# Patient Record
Sex: MALE | Race: BLACK OR AFRICAN AMERICAN | Employment: FULL TIME | URBAN - METROPOLITAN AREA
[De-identification: names, ages, dates, MRNs, and addresses within clinical notes are randomized per-mention and may not be internally consistent; named-entity substitution may affect disease eponyms.]

---

## 2017-05-11 ENCOUNTER — TELEPHONE (OUTPATIENT)
Dept: FAMILY MEDICINE CLINIC | Facility: CLINIC | Age: 47
End: 2017-05-11

## 2017-05-11 NOTE — TELEPHONE ENCOUNTER
Pt is requesting a referral for a podiatrist  DX Code:bone spur in heal   CPT Code for visit: OV   Number of visits requested:4   If Requesting Out of Network Provider, Please provide reason  Last seen 3/2016.  Apt needed

## 2017-05-17 ENCOUNTER — OFFICE VISIT (OUTPATIENT)
Dept: FAMILY MEDICINE CLINIC | Facility: CLINIC | Age: 47
End: 2017-05-17

## 2017-05-17 VITALS
HEART RATE: 87 BPM | WEIGHT: 242 LBS | BODY MASS INDEX: 29.78 KG/M2 | DIASTOLIC BLOOD PRESSURE: 74 MMHG | HEIGHT: 75.5 IN | OXYGEN SATURATION: 99 % | RESPIRATION RATE: 16 BRPM | TEMPERATURE: 99 F | SYSTOLIC BLOOD PRESSURE: 118 MMHG

## 2017-05-17 DIAGNOSIS — M77.32 HEEL SPUR, LEFT: Primary | ICD-10-CM

## 2017-05-17 DIAGNOSIS — M79.672 INTRACTABLE LEFT HEEL PAIN: ICD-10-CM

## 2017-05-17 PROCEDURE — 99213 OFFICE O/P EST LOW 20 MIN: CPT | Performed by: FAMILY MEDICINE

## 2017-05-17 NOTE — PATIENT INSTRUCTIONS
Understanding Heel Pain  Your heel is the back part of your foot. A band of tissue called the plantar fascia connects the heel bone to the bones in the ball of your foot. Nerves run from the heel up the inside of your ankle and into your leg.  When you fe The plantar fascia is a thick, fibrous layer of tissue that covers the bones on the bottom of your foot. It holds the foot bones in an arched position. Plantar fasciitis is a painful swelling of the plantar fascia.   A heel spur is an overgrowth of bone whe · Avoid activities that stress the feet: jogging, prolonged standing or walking, contact sports, etc.  · First thing in the morning and before sports, stretch the bottom of your foot. Gently flex your ankle so the toes move toward your knee.   · Icing may h

## 2017-05-17 NOTE — PROGRESS NOTES
Imani Barton is a 42year old male. HPI:   Pt states active with exercise and plays basketball. He has a hx of right heel spur in past in 2014 and had surgery which relieved all of his symptoms. At that time he was diagnosed with a left heel spur.  He now 118/74 mmHg  Pulse 87  Temp(Src) 98.7 °F (37.1 °C) (Temporal)  Resp 16  Ht 75.5\"  Wt 242 lb  BMI 29.84 kg/m2  SpO2 99%  GENERAL: well developed, well nourished,in no apparent distress, tall athletic black male  SKIN: no rashes,no suspicious lesions  HEENT

## 2017-05-25 ENCOUNTER — TELEPHONE (OUTPATIENT)
Dept: FAMILY MEDICINE CLINIC | Facility: CLINIC | Age: 47
End: 2017-05-25

## 2017-05-25 DIAGNOSIS — M76.62 TENDONITIS, ACHILLES, LEFT: ICD-10-CM

## 2017-05-25 DIAGNOSIS — M25.772 OSTEOPHYTE OF LEFT ANKLE: Primary | ICD-10-CM

## 2017-05-26 NOTE — TELEPHONE ENCOUNTER
I spoke to fernando from Dr Jeffory Dancer office, she said the insurance company told her the PCP needs to write out the referral with the CPT codes of 0421 34 84 07, DX code M25.772 and M76.62. No pre-cert is needed.  I told her I can put referral in syst

## 2017-05-31 ENCOUNTER — TELEPHONE (OUTPATIENT)
Dept: FAMILY MEDICINE CLINIC | Facility: CLINIC | Age: 47
End: 2017-05-31

## 2017-06-15 ENCOUNTER — APPOINTMENT (OUTPATIENT)
Dept: LAB | Age: 47
End: 2017-06-15
Attending: FAMILY MEDICINE
Payer: COMMERCIAL

## 2017-06-15 ENCOUNTER — OFFICE VISIT (OUTPATIENT)
Dept: FAMILY MEDICINE CLINIC | Facility: CLINIC | Age: 47
End: 2017-06-15

## 2017-06-15 VITALS
SYSTOLIC BLOOD PRESSURE: 122 MMHG | HEIGHT: 75 IN | TEMPERATURE: 98 F | HEART RATE: 77 BPM | RESPIRATION RATE: 12 BRPM | BODY MASS INDEX: 28.6 KG/M2 | WEIGHT: 230 LBS | OXYGEN SATURATION: 99 % | DIASTOLIC BLOOD PRESSURE: 70 MMHG

## 2017-06-15 DIAGNOSIS — E55.9 VITAMIN D DEFICIENCY: ICD-10-CM

## 2017-06-15 DIAGNOSIS — Z01.818 PREOP EXAMINATION: ICD-10-CM

## 2017-06-15 DIAGNOSIS — M77.32 HEEL SPUR, LEFT: ICD-10-CM

## 2017-06-15 DIAGNOSIS — Z01.818 PREOP EXAMINATION: Primary | ICD-10-CM

## 2017-06-15 DIAGNOSIS — R94.31 ABNORMAL ELECTROCARDIOGRAM (ECG) (EKG): ICD-10-CM

## 2017-06-15 PROCEDURE — 82306 VITAMIN D 25 HYDROXY: CPT

## 2017-06-15 PROCEDURE — 99214 OFFICE O/P EST MOD 30 MIN: CPT | Performed by: FAMILY MEDICINE

## 2017-06-15 PROCEDURE — 36415 COLL VENOUS BLD VENIPUNCTURE: CPT

## 2017-06-15 PROCEDURE — 80053 COMPREHEN METABOLIC PANEL: CPT

## 2017-06-15 PROCEDURE — 85027 COMPLETE CBC AUTOMATED: CPT

## 2017-06-15 PROCEDURE — 93000 ELECTROCARDIOGRAM COMPLETE: CPT | Performed by: FAMILY MEDICINE

## 2017-06-15 NOTE — PROGRESS NOTES
HPI:    Patient ID: Tanner Varela is a 42year old male. HPI  Patient is here for preop physical for surgery for heel spur on the left  Heel on 6/23/2017. Patient feels well and has no history of anesthetic problems.   He had surgery on the right heel f diagnosis)  Heel spur, left  Abnormal electrocardiogram (ecg) (ekg)  Vitamin d deficiency  Patient has normal physical exam.  EKG does show ST elevation, probable repolarization variant. Will get opinion from cardiology and clearance prior to surgery.

## 2017-06-19 ENCOUNTER — TELEPHONE (OUTPATIENT)
Dept: FAMILY MEDICINE CLINIC | Facility: CLINIC | Age: 47
End: 2017-06-19

## 2017-06-19 NOTE — TELEPHONE ENCOUNTER
----- Message from Charmayne Devoid, MD sent at 6/16/2017 10:44 AM CDT -----  Labs are good for surgery. I did recheck a Vit D as he has a history of vitamin D deficiency. It is somewhat low at 25. Recommend vitamin D OTC 0119-4528 units daily.   Recheck

## 2017-06-19 NOTE — TELEPHONE ENCOUNTER
Pt was seen on 06/15 for pre op physical and was seen by cardiology on 6/16 and was cleared for surgery. All labs were ok for surgery.   Please addend note from 06/15/17 clearing patient for his upcoming surgery

## 2018-01-18 ENCOUNTER — PATIENT OUTREACH (OUTPATIENT)
Dept: FAMILY MEDICINE CLINIC | Facility: CLINIC | Age: 48
End: 2018-01-18

## (undated) NOTE — MR AVS SNAPSHOT
[facility] [address] [place] [address] [address] [facility]  [id number] [phone]               Thank you for choosing us for your health care visit with Christian Valenzuela DO.   We are glad to serve you and happy to provide you wi physician's office. At that time, you will be provided with any authorization numbers or be assured that none are required. You can then schedule your appointment.  Failure to obtain required authorization numbers can create reimbursement difficulties for y To prevent future heel pain, wear shoes with well-cushioned heels. And do exercises prescribed by your healthcare provider to stretch the plantar fascia and the muscles in the lower leg.    Date Last Reviewed: 9/10/2015  © 3265-6065 [employer] Replace athletic shoes when they become worn out. Don’t walk or run barefoot. · Premade or custom-fitted shoe inserts may be helpful.  Inserts made of silicone seem to be the most effective. Custom-made inserts can be provided by a podiatrist or foot speci Allergies as of May 17, 2017     No Known Allergies                Today's Vital Signs     BP Pulse Temp Height Weight BMI    118/74 mmHg 87 98.7 °F (37.1 °C) (Temporal) 75.5\" 242 lb 29.84 kg/m2         Current Medications          This list is accurate a HOW TO GET STARTED: HOW TO STAY MOTIVATED:   Start activities slowly and build up over time Do what you like   Get your heart pumping – brisk walking, biking, swimming Even 10 minute increments are effective and add up over the week   2 ½ hours per week –

## (undated) NOTE — MR AVS SNAPSHOT
[facility] [address] [place] [address] [address] [facility]  [address]ae Carp[address] [id number] [phone]               Thank you for choosing us for your health care visit with Parish Be MD.  We are glad to serve you and happy to provide you wit CARDIO - EXTERNAL [[id number] CPT(R)]  Order #:  [id number]         **REFERRAL REQUEST**    Your physician has referred you to a specialist.  Your physician or the clinic staff will provide you with the phone number you should call to schedule your appointment office, you can view your past visit information in [employer] by going to Visits < Visit Summaries. [employer] questions? Call [phone] for help. [employer] is NOT to be used for urgent needs. For medical emergencies, dial 911.            Visit [facility]